# Patient Record
Sex: MALE | Race: BLACK OR AFRICAN AMERICAN | NOT HISPANIC OR LATINO | Employment: OTHER | URBAN - METROPOLITAN AREA
[De-identification: names, ages, dates, MRNs, and addresses within clinical notes are randomized per-mention and may not be internally consistent; named-entity substitution may affect disease eponyms.]

---

## 2018-10-29 ENCOUNTER — HOSPITAL ENCOUNTER (EMERGENCY)
Facility: HOSPITAL | Age: 78
Discharge: HOME | End: 2018-10-29
Attending: EMERGENCY MEDICINE | Admitting: EMERGENCY MEDICINE
Payer: COMMERCIAL

## 2018-10-29 VITALS
DIASTOLIC BLOOD PRESSURE: 100 MMHG | BODY MASS INDEX: 29.18 KG/M2 | OXYGEN SATURATION: 96 % | TEMPERATURE: 98.6 F | WEIGHT: 170 LBS | RESPIRATION RATE: 20 BRPM | HEART RATE: 107 BPM | SYSTOLIC BLOOD PRESSURE: 168 MMHG

## 2018-10-29 DIAGNOSIS — L89.90 DECUBITAL ULCER: Primary | ICD-10-CM

## 2018-10-29 PROCEDURE — 99283 EMERGENCY DEPT VISIT LOW MDM: CPT

## 2018-10-29 RX ORDER — TRAMADOL HYDROCHLORIDE 50 MG/1
50 TABLET ORAL EVERY 6 HOURS PRN
Qty: 30 TABLET | Refills: 0 | Status: SHIPPED | OUTPATIENT
Start: 2018-10-29 | End: 2018-11-08

## 2018-10-29 NOTE — DISCHARGE INSTRUCTIONS
Pressure Ulcer   WHAT YOU NEED TO KNOW:   A pressure ulcer is an injury to the skin or tissue over a bony area  A pressure ulcer is also called a pressure sore, bedsore, or decubitus ulcer  Pressure ulcers can form over any bony area but are most common on the back, buttocks, hips, and heels  DISCHARGE INSTRUCTIONS:   Contact your healthcare provider if:   · You have a fever  · You have green or yellow drainage or a bad smell coming from your pressure ulcer  · An area of your skin is very red and firm  · You have new pain, or pain that is getting worse  · You have questions or concerns about your condition or care  Medicines:   · Medicines  may be given to decrease pain or to help treat or prevent a bacterial infection  Ask how to take these medicines safely  · Take your medicine as directed  Contact your healthcare provider if you think your medicine is not helping or if you have side effects  Tell him or her if you are allergic to any medicine  Keep a list of the medicines, vitamins, and herbs you take  Include the amounts, and when and why you take them  Bring the list or the pill bottles to follow-up visits  Carry your medicine list with you in case of an emergency  Change your position often:  Change your position every 2 hours if you are in a bed all day  Change your position every hour if you are in a wheelchair all day  Set an alarm to help remind you when it is time to turn  Keep a written turning schedule to help you remember to turn  Care for your skin:   · Clean and cover your wound  You may need to keep a bandage over your wound to protect the skin from more damage  You may also need to clean your wound with saline  Ask your healthcare provider for more information about when and how to change your bandages  · Keep your skin clean, dry, and moisturized  Use mild soap and warm water to clean your skin  Do not rub or scrub when you wash   Do not use products that contain alcohol, because they can dry out your skin  Gently pat your skin dry  Do not rub your skin with a towel  Apply lotion or a moisturizer on your skin often  · Protect the skin over bony areas  Use pillows or foam wedges to keep bony areas from touching, and to relieve pressure  For example, put a pillow or foam wedge between your knees to keep them from pressing on one another  Place a pillow or foam wedge under you to keep your hip raised when you lie on your side  Do not rest directly on your hipbone  Put a foam pad or pillow under your legs from calf to ankle when you lie on your back  The pad or pillow should raise your heels so that they are not touching the bed  · Use medical equipment and pads  A draw sheet or large pad under you may help others move you up in bed  An overhead trapeze can help you change positions in bed  Mattresses and overlays made to provide more cushioning may help decrease the risk of pressure ulcers  Examples include a foam mattress pad and air or water mattresses  Ask about equipment that may be right for you and how you use it  Eat a variety of healthy foods:  Healthy foods include fruits, vegetables, whole-grain breads, and fish  Foods that are high in protein may help your pressure ulcer heal  This includes lean meats, beans, milk, yogurt, and cheese  Nutrition shakes may also give you extra calories and protein if you have trouble eating or are underweight  Do not smoke:  Nicotine can damage your skin and slow wound healing  Do not use e-cigarettes or smokeless tobacco in place of cigarettes or to help you quit  They still contain nicotine  Ask your healthcare provider for information if you currently smoke and need help to quit  Follow up with your healthcare provider as directed: You may need to return often for wound checks  Write down your questions so you remember to ask them during your visits     © 2017 [employer] Information is for End User's use only and may not be sold, redistributed or otherwise used for commercial purposes  All illustrations and images included in CareNotes® are the copyrighted property of [employer]  or [employer]  The above information is an  only  It is not intended as medical advice for individual conditions or treatments  Talk to your doctor, nurse or pharmacist before following any medical regimen to see if it is safe and effective for you

## 2018-10-29 NOTE — ED PROVIDER NOTES
History  No chief complaint on file  72-year-old gentleman presents with complaint of increasing pain at the site of his sacral decubitus ulcer  He has a history of a stroke and is currently bed bound  Previously had a very severe decubitus ulcer and had to be admitted for this  He followed up with wound care with near complete resolution of the area  His family reports that the air mattress that they have is no longer functioning correctly continuously care causing increased pressure of his sacral region  He complains of increasing pain is not have any pain medicines for this complaint  There is no report of fever or other acute issues  Rash   Location: sacrum  Quality: painful    Pain details:     Quality:  Aching    Severity:  Moderate    Onset quality:  Gradual    Timing:  Constant    Progression:  Waxing and waning  Severity:  Mild  Onset quality:  Gradual  Timing:  Constant  Progression:  Worsening  Associated symptoms: no abdominal pain, no diarrhea, no fever, no nausea, no shortness of breath and not vomiting        None       No past medical history on file  No past surgical history on file  No family history on file  I have reviewed and agree with the history as documented  Social History   Substance Use Topics    Smoking status: Not on file    Smokeless tobacco: Not on file    Alcohol use Not on file        Review of Systems   Constitutional: Negative for chills and fever  HENT: Negative for postnasal drip and rhinorrhea  Eyes: Negative  Respiratory: Negative for cough, chest tightness and shortness of breath  Cardiovascular: Negative for chest pain  Gastrointestinal: Negative for abdominal distention, abdominal pain, constipation, diarrhea, nausea and vomiting  Endocrine: Negative  Genitourinary: Negative  Musculoskeletal: Negative  Skin: Positive for rash  Allergic/Immunologic: Negative      Neurological: Positive for weakness (chronic, unchanged)  Hematological: Negative  Physical Exam  Physical Exam   Constitutional: He is oriented to person, place, and time  He appears well-developed and well-nourished  HENT:   Head: Normocephalic  Nose: Nose normal    Mouth/Throat: Oropharynx is clear and moist    Eyes: Pupils are equal, round, and reactive to light  Conjunctivae are normal    Neck: Neck supple  Cardiovascular: Normal rate, regular rhythm and intact distal pulses  Pulmonary/Chest: Effort normal and breath sounds normal    Abdominal: Soft  Bowel sounds are normal    Musculoskeletal: He exhibits no edema or tenderness  Neurological: He is alert and oriented to person, place, and time  Chronic weakness secondary to prior CVA  Skin: Skin is warm  Capillary refill takes less than 2 seconds  Psychiatric: He has a normal mood and affect  His behavior is normal    Nursing note and vitals reviewed  Vital Signs  ED Triage Vitals   Temp Pulse Resp BP SpO2   -- -- -- -- --      Temp src Heart Rate Source Patient Position - Orthostatic VS BP Location FiO2 (%)   -- -- -- -- --      Pain Score       --           There were no vitals filed for this visit  Visual Acuity      ED Medications  Medications - No data to display    Diagnostic Studies  Results Reviewed     None                 No orders to display              Procedures  Procedures       Phone Contacts  ED Phone Contact    ED Course                               [facility]    Disposition  Final diagnoses:   None     ED Disposition     None      Follow-up Information    None         Patient's Medications    No medications on file     No discharge procedures on file      ED Provider  Electronically Signed by           Vandana Dumont DO  10/29/18 Mary Kay Luna DO  10/29/18 [id number]

## 2018-11-06 ENCOUNTER — APPOINTMENT (OUTPATIENT)
Dept: WOUND CARE | Facility: HOSPITAL | Age: 78
End: 2018-11-06
Payer: COMMERCIAL

## 2018-11-06 PROCEDURE — 99213 OFFICE O/P EST LOW 20 MIN: CPT | Performed by: FAMILY MEDICINE

## 2018-11-27 ENCOUNTER — APPOINTMENT (OUTPATIENT)
Dept: WOUND CARE | Facility: HOSPITAL | Age: 78
End: 2018-11-27
Payer: COMMERCIAL

## 2018-11-27 PROCEDURE — 99212 OFFICE O/P EST SF 10 MIN: CPT | Performed by: FAMILY MEDICINE
